# Patient Record
Sex: MALE | Race: BLACK OR AFRICAN AMERICAN | NOT HISPANIC OR LATINO | URBAN - METROPOLITAN AREA
[De-identification: names, ages, dates, MRNs, and addresses within clinical notes are randomized per-mention and may not be internally consistent; named-entity substitution may affect disease eponyms.]

---

## 2018-06-08 ENCOUNTER — EMERGENCY (EMERGENCY)
Facility: HOSPITAL | Age: 33
LOS: 1 days | Discharge: HOME | End: 2018-06-08

## 2018-06-08 VITALS
OXYGEN SATURATION: 99 % | WEIGHT: 229.94 LBS | DIASTOLIC BLOOD PRESSURE: 89 MMHG | SYSTOLIC BLOOD PRESSURE: 135 MMHG | RESPIRATION RATE: 16 BRPM | HEIGHT: 72 IN | HEART RATE: 99 BPM | TEMPERATURE: 99 F

## 2018-06-08 NOTE — ED ADULT TRIAGE NOTE - CHIEF COMPLAINT QUOTE
pt lynda, family called ems because pt was acting tired and weak, pt admits to taking 1 bag of heroin tonight for the first time in 4 years, a/o x 3, no distress

## 2018-06-08 NOTE — ED ADULT NURSE NOTE - OBJECTIVE STATEMENT
pt states used 1 bag of heroin today, and fall a sleep and rolled out of the bed and his mom heard the fall and called ambulance  vss, alert orientedx4, steady gait, no respiratory nor cardiac distress

## 2019-03-08 ENCOUNTER — EMERGENCY (EMERGENCY)
Facility: HOSPITAL | Age: 34
LOS: 1 days | Discharge: HOME | End: 2019-03-08
Attending: EMERGENCY MEDICINE
Payer: MEDICAID

## 2019-03-08 VITALS
RESPIRATION RATE: 18 BRPM | DIASTOLIC BLOOD PRESSURE: 73 MMHG | HEART RATE: 84 BPM | WEIGHT: 265 LBS | TEMPERATURE: 99 F | OXYGEN SATURATION: 97 % | HEIGHT: 72 IN | SYSTOLIC BLOOD PRESSURE: 128 MMHG

## 2019-03-08 PROCEDURE — 99284 EMERGENCY DEPT VISIT MOD MDM: CPT | Mod: 25

## 2019-03-08 PROCEDURE — 94640 AIRWAY INHALATION TREATMENT: CPT | Mod: 25

## 2019-03-08 RX ORDER — IPRATROPIUM/ALBUTEROL SULFATE 18-103MCG
3 AEROSOL WITH ADAPTER (GRAM) INHALATION ONCE
Qty: 0 | Refills: 0 | Status: COMPLETED | OUTPATIENT
Start: 2019-03-08 | End: 2019-03-08

## 2019-03-08 RX ORDER — ALBUTEROL 90 UG/1
2 AEROSOL, METERED ORAL
Qty: 1 | Refills: 0 | OUTPATIENT
Start: 2019-03-08 | End: 2019-04-06

## 2019-03-08 RX ADMIN — Medication 3 MILLILITER(S): at 01:18

## 2019-03-08 RX ADMIN — Medication 40 MILLIGRAM(S): at 03:01

## 2019-03-08 RX ADMIN — Medication 3 MILLILITER(S): at 01:19

## 2019-03-08 NOTE — ED ADULT NURSE REASSESSMENT NOTE - NS ED NURSE REASSESS COMMENT FT1
pt sleeping comfortably on stretcher. easily arousable. vitals stable. no complaints at this time. will continue to monitor.

## 2019-03-08 NOTE — ED PROVIDER NOTE - CLINICAL SUMMARY MEDICAL DECISION MAKING FREE TEXT BOX
pt with non-productive cough x3 weeks, wheezing, will give duo neb x2, reassess, advised outpatient follow up for abdominal pain and possible hernia pt with non-productive cough x3 weeks, wheezing, will give duo neb x2, reassess, advised outpatient follow up for abdominal pain

## 2019-03-08 NOTE — ED ADULT NURSE NOTE - OBJECTIVE STATEMENT
assumed care of pt @ 0115. pt a&ox3. pt c/o unproductive cough and abdominal pain for 3 weeks. "I think I gave myself a hernia from coughing". pt presents with breaths even and unlabored. lung sounds clear bilaterally. denies any chest pain or sob or difficulty breathing. denies fever at home. no other complaints at this time.

## 2019-03-08 NOTE — ED PROVIDER NOTE - CHPI ED SYMPTOMS NEG
no chills/no chest pain/N/V, ear pain, throat pain, nasal congestion/no fever/no shortness of breath

## 2019-03-08 NOTE — ED PROVIDER NOTE - PROGRESS NOTE DETAILS
PA NOTE: Pt feeling better, wheezing has improved, pt able to walk without shortness of breath or drop in o2 sat. pt to be d/c on ventolin, prednisone and benzonatate, will have pt f/u with gen surg for possible hernia.

## 2019-03-08 NOTE — ED PROVIDER NOTE - PHYSICAL EXAMINATION
diffuse expiratory wheezing  non-tender, slight R -sdie bulge, worse with couhg or stand, reducible, non tender, bowel sounds, no CVAT

## 2019-03-08 NOTE — ED PROVIDER NOTE - GASTROINTESTINAL, MLM
abdomen soft, non-tender. slight R-sided bulge, worse with cough or stand, reducible, nontender.  normal bowel sounds. abdomen soft, non-tender. no palpable mass.  normal bowel sounds.

## 2019-03-08 NOTE — ED PROVIDER NOTE - OBJECTIVE STATEMENT
32 y/o M pt with no PMHx presents to the ED c/o persistent cough beginning 3 weeks ago.  Pt reports persistent, non-productive cough for the past 3 weeks.  He notes 2 days ago he began to feel R-sided abdominal pain when coughing, and noticed a bulge to his R abdomen.  Pt spoke with a friend who is a nurse, told pt he likely has a hernia, and suggested he see a doctor.  Pt notes his abdominal pain is worsened with coughing or standing up.  He is able to pass gas, last BM was this morning.  He has taken DayQuil with no symptomatic relief.  Pt has not seen his PMD for these symptoms.  No sick contacts.  Denies fever, chills, throat pain, ear pain, congestion, CP, SOB, N/V.  No further acute complaints at this time.

## 2019-03-21 ENCOUNTER — APPOINTMENT (OUTPATIENT)
Dept: TRAUMA SURGERY | Facility: CLINIC | Age: 34
End: 2019-03-21

## 2021-06-17 NOTE — ED ADULT TRIAGE NOTE - CHIEF COMPLAINT QUOTE
Telephone Encounter by Tyler Cota RN at 2/9/2021  2:32 PM     Author: Tyler Cota RN Service: -- Author Type: Registered Nurse    Filed: 2/9/2021  2:45 PM Encounter Date: 2/9/2021 Status: Signed    : Tyler Cota RN (Registered Nurse)       ANTICOAGULATION  MANAGEMENT    Assessment     Today's INR result of 1.60 is Subtherapeutic (goal INR of 2.0-3.0)        More warfarin taken than instructed which may be affecting INR    No new diet changes affecting INR    No new medication/supplements affecting INR - Reports that he has not been doing chemo for 2 months.     Continues to tolerate warfarin with no reported s/s of bleeding or thromboembolism     Previous INR was Therapeutic    Plan:     Spoke on phone with Cam regarding INR result and instructed:      Warfarin Dosing Instructions:  Change warfarin dose to 7.5 mg daily on Tue & Fri; and 5 mg daily rest of week  (14.2 % change) - Percent increase based off what patient had been taking since last INR check of 5mg today, increase made from 35mg/wk to 40mg/wk= 14.2% increase    Instructed patient to follow up no later than: 2 weeks    Education provided: importance of therapeutic range, target INR goal and significance of current INR result and importance of taking warfarin as instructed    Cam verbalizes understanding and agrees to warfarin dosing plan.    Instructed to call the [facility] for any changes, questions or concerns. (#[phone])   ?   Tyler Cota RN    Subjective/Objective:      Cam Walden, a 70 y.o. male is on warfarin. Cam Levy reports:     Home warfarin dose: verbally confirmed home dose with Cam and updated on anticoagulation calendar     Missed doses: No     Medication changes:  No     S/S of bleeding or thromboembolism:  No     New Injury or illness:  No     Changes in diet or alcohol consumption:  No     Upcoming surgery, procedure or cardioversion:  No    Anticoagulation Episode Summary     Current INR goal:  2.0-3.0   TTR:   coughing x 2 weeks pain in abdomen right sided opened yesterday 75.1 % (9.6 mo)   Next INR check:  12/17/2020   INR from last check:     Most recent INR:   1.60 (2/9/2021)   Weekly max warfarin dose:     Target end date:  Indefinite   INR check location:     Preferred lab:     Send INR reminders to:  [facility]    Indications    Atrial flutter (H) (Resolved) [I48.92]           Comments:           Anticoagulation Care Providers     Provider Role Specialty Phone number    Kamaljit Plata MD Referring Family Medicine [phone]

## 2022-11-01 NOTE — ED PROVIDER NOTE - CONTEXT
Do You Have Any Concerning Skin Lesions Today?  If No, You Do Not Have To Fill Out The Remainder Of The Questions.: yes
Where Is Your Skin Lesion(S) Of Concern Located?: Leg
unknown

## 2024-03-15 NOTE — ED PROVIDER NOTE - NS ED MD EM SELECTION
Will keep the scheduled appointment with Dr. Luna but see if patient can get in with another provider sooner.   [id number] Exp Problem Focused - Mod. Complex